# Patient Record
Sex: FEMALE
[De-identification: names, ages, dates, MRNs, and addresses within clinical notes are randomized per-mention and may not be internally consistent; named-entity substitution may affect disease eponyms.]

---

## 2019-04-13 ENCOUNTER — HOSPITAL ENCOUNTER (EMERGENCY)
Dept: HOSPITAL 14 - H.ER | Age: 59
Discharge: HOME | End: 2019-04-13
Payer: COMMERCIAL

## 2019-04-13 VITALS — TEMPERATURE: 98.1 F

## 2019-04-13 VITALS — DIASTOLIC BLOOD PRESSURE: 70 MMHG | HEART RATE: 82 BPM | SYSTOLIC BLOOD PRESSURE: 140 MMHG | RESPIRATION RATE: 18 BRPM

## 2019-04-13 VITALS — BODY MASS INDEX: 25.9 KG/M2

## 2019-04-13 VITALS — OXYGEN SATURATION: 98 %

## 2019-04-13 DIAGNOSIS — N61.0: Primary | ICD-10-CM

## 2019-04-13 NOTE — ED PDOC
HPI: Chest Pain


Time Seen by Provider: 04/13/19 10:01


Chief Complaint (Nursing): Breast Problem


Chief Complaint (Provider): Breast redness


History Per: Patient


History/Exam Limitations: no limitations


Onset/Duration Of Symptoms: Days


Current Symptoms Are (Timing): Still Present


Pain Scale Rating Of: 1


Additional Complaint(s): 





Pt. is a healthy 57 y/o Female, visiting from [place], who noted redness to right

breast over past 5 days, initially starting as a small pink area 5d. ago and got

bigger and more red over past week.  Pt. denies any associated pain, fevers, 

chills.  Pt. reports mammogram every 2 yrs, had one mass noted 8-10 yrs. ago 

which was biopsied and was negative as per pt.  





Past Medical History


Reviewed: Nursing Documentation, Vital Signs


Vital Signs: 





                                Last Vital Signs











Temp  98.1 F   04/13/19 09:48


 


Pulse  86   04/13/19 09:48


 


Resp  17   04/13/19 09:48


 


BP  144/76   04/13/19 09:48


 


Pulse Ox  98   04/13/19 09:54














- Medical History


PMH: No Chronic Diseases





- Surgical History


Surgical History: No Surg Hx





- Family History


Family History: States: Other


Other Family History: Breast CA: mother, sister





Review of Systems


Constitutional: Negative for: Fever, Chills


Cardiovascular: Negative for: Chest Pain


Respiratory: Negative for: Cough, Shortness of Breath


Gastrointestinal: Negative for: Nausea


Skin: Positive for: Other (redness noted to right breast)





Physical Exam





- Reviewed


Nursing Documentation Reviewed: Yes


Vital Signs Reviewed: Yes





- Physical Exam


Appears: Positive for: Well, Non-toxic


Head Exam: Positive for: ATRAUMATIC


Skin: Positive for: Warm, Dry


Eye Exam: Positive for: Normal appearance


Neck: Positive for: Normal


Cardiovascular/Chest: Positive for: Regular Rate, Rhythm.  Negative for: Other 

(BREAST EXAM: Right Breast: (+) erythematous linear streak from nipple extending

superiorly to 10 o'clock position. (-) associated tenderness, (-) induration, (-

) fluctuance, (-) palpable mass, (-) axillary lymphadenopathy (-) nipple 

discharge (-) skin changes (-) peau d'orange.  LEFT BREAST: normal exam, no 

axillary lymphadenopathy)


Respiratory: Positive for: Normal Breath Sounds





- ECG


O2 Sat by Pulse Oximetry: 98





Medical Decision Making


Medical Decision Making: 





Exam as above, will cover with antibiotics. Keflex po given. 


Discussed with pt. (and ) at length that exam consistent with superficial

infection, po abx. trial however if any worsening of erythema, any swelling, 

induration or mass pt. is to return to ED.  Pt. is flying back to [place] 

thursday 4/18/19 and will f/u with PMD at that time, as imaging may be warranted

although not emergently indicated at this time.





Disposition





- Clinical Impression


Clinical Impression: 


 Cellulitis of breast








- Patient ED Disposition


Is Patient to be Admitted: No


Counseled Patient/Family Regarding: Diagnosis, Need For Followup, Rx Given





- Disposition


Referrals: 


Formerly [facility] [Outside]


Disposition: Routine/Home


Disposition Time: 10:40


Condition: STABLE


Additional Instructions: 


warm compresses to area.  return to ED if worse, increased redness, swelling, 

fevers or other concerns.